# Patient Record
Sex: FEMALE | Race: BLACK OR AFRICAN AMERICAN | NOT HISPANIC OR LATINO | URBAN - METROPOLITAN AREA
[De-identification: names, ages, dates, MRNs, and addresses within clinical notes are randomized per-mention and may not be internally consistent; named-entity substitution may affect disease eponyms.]

---

## 2021-03-29 ENCOUNTER — EMERGENCY (EMERGENCY)
Facility: HOSPITAL | Age: 26
LOS: 1 days | Discharge: HOME | End: 2021-03-29
Attending: EMERGENCY MEDICINE | Admitting: EMERGENCY MEDICINE
Payer: MEDICAID

## 2021-03-29 VITALS
SYSTOLIC BLOOD PRESSURE: 114 MMHG | HEART RATE: 86 BPM | OXYGEN SATURATION: 98 % | RESPIRATION RATE: 16 BRPM | DIASTOLIC BLOOD PRESSURE: 63 MMHG | TEMPERATURE: 98 F

## 2021-03-29 DIAGNOSIS — Z78.9 OTHER SPECIFIED HEALTH STATUS: Chronic | ICD-10-CM

## 2021-03-29 PROCEDURE — 99284 EMERGENCY DEPT VISIT MOD MDM: CPT | Mod: 25

## 2021-03-29 PROCEDURE — 10080 I&D PILONIDAL CYST SIMPLE: CPT

## 2021-03-29 RX ORDER — LIDOCAINE HCL 20 MG/ML
10 VIAL (ML) INJECTION ONCE
Refills: 0 | Status: COMPLETED | OUTPATIENT
Start: 2021-03-29 | End: 2021-03-29

## 2021-03-29 RX ORDER — ACETAMINOPHEN 500 MG
650 TABLET ORAL ONCE
Refills: 0 | Status: COMPLETED | OUTPATIENT
Start: 2021-03-29 | End: 2021-03-29

## 2021-03-29 RX ADMIN — Medication 650 MILLIGRAM(S): at 05:45

## 2021-03-29 RX ADMIN — Medication 10 MILLILITER(S): at 06:25

## 2021-03-29 NOTE — ED PROVIDER NOTE - ATTENDING CONTRIBUTION TO CARE
Attending Statement: I have personally seen and examined this patient. I have fully participated in the care of this patient. I have reviewed all pertinent clinical information, including history physical exam, plan and the Resident's note and agree except as noted  26yo F no sig pmhx pw cyst of the back x one week. Seen at  two days ago for I/D and given abx. Due to  abx  today. Denies fever/chills. Denies n/v PT is 5 months pregnant Attending Statement: I have personally seen and examined this patient. I have fully participated in the care of this patient. I have reviewed all pertinent clinical information, including history physical exam, plan and the Resident's note and agree except as noted  24yo F no sig pmhx pw cyst of the back x one week. Seen at  two days ago for I/D and given abx. Due to  abx  today. Denies fever/chills. Denies n/v PT is 5 months pregnant, no abdominal cramp. no vag bleeding or discharge. +fetal movement.   Vital signs noted. mmm. nontoxic appearing female. ambulatory no distress gravid abdomen nontender. +pilonidal cyst tender to touch, fluctuant. no pedal edema. no calf tenderness. normal pulses bilateral feet.  plan  I/D, fetal HR, re assess

## 2021-03-29 NOTE — ED PROVIDER NOTE - NS ED ROS FT
Gen: Denies fever, weight loss  CV: Denies chest pain, palpitations  Skin: see HPI  Resp: Denies SOB, cough  Endo: Denies sensitivity to heat, cold, increased urination  GI: Denies constipation, nausea, vomiting  Msk: Denies back pain, LE swelling, extremity pain  : see HPI  Neuro: Denies LOC, weakness, seizures  Psych: Denies hx of psych, hallucinations

## 2021-03-29 NOTE — ED PROVIDER NOTE - PATIENT PORTAL LINK FT
You can access the FollowMyHealth Patient Portal offered by [facility] by registering at the following website: [email or web address]. By joining [employer]’s FollowMyHealth portal, you will also be able to view your health information using other applications (apps) compatible with our system.

## 2021-03-29 NOTE — ED ADULT NURSE NOTE - NSIMPLEMENTINTERV_GEN_ALL_ED
Implemented All Universal Safety Interventions:  [place] to call system. Call bell, personal items and telephone within reach. Instruct patient to call for assistance. Room bathroom lighting operational. Non-slip footwear when patient is off stretcher. Physically safe environment: no spills, clutter or unnecessary equipment. Stretcher in lowest position, wheels locked, appropriate side rails in place.

## 2021-03-29 NOTE — ED ADULT NURSE NOTE - OBJECTIVE STATEMENT
received pt in room 12 c/o pain to pilonidal cyst.  had partial drainage on Friday but area still painful and hardened.  pt is23 weeks  months pregnant.. due date  july 21. no abd pain or vag bleeding. cyst not draining.  being eval by md.

## 2021-03-29 NOTE — ED PROVIDER NOTE - PHYSICAL EXAMINATION
PHYSICAL EXAM:  GENERAL: Sitting comfortable in bed, in no acute distress  HENMT: Atraumatic, moist mucous membranes, no oropharyngeal exudates or vesicles, uvula is midline EYES: Clear bilaterally, PERRL, EOMs intact b/l  HEART: RRR, S1/S2, no murmur/gallops/rubs  RESPIRATORY: Clear to auscultation bilaterally, no wheezes/rhonchi/rales  ABDOMEN: +BS, gravid, nontender, nondistended  EXTREMITIES: No lower extremity edema, +2 radial pulses b/l  NEURO:  A&Ox4, no focal motor deficits or sensory deficits   Heme/LYMPH: No ecchymosis or bruising, no anterior/posterior cervical or supraclavicular LAD  SKIN:  Skin normal color for race, warm, dry and intact. (+) small raised abscess like mass to gluteal cleft, consistent with pilonidal cyst.

## 2021-03-29 NOTE — ED PROVIDER NOTE - OBJECTIVE STATEMENT
26 yo F, 5 months pregnant, complaining of pain in her gluteal cleft after having incomplete drainage of pilonidal cyst at  2 days ago. She denies any fevers, chills, abdominal pain, vaginal bleeding, loss of fluids, lack of fetal movement, chest pain, shortness of breath, palpitations, or any other complaints at this time. She has had recurrent pilonidal cysts over the past few years and says that she was supposed to have excision by surgery but then moved to the area before following up. She had I&D 2 days ago and was prescribed antibiotics which she has not picked up yet but says the medication will be ready today.

## 2021-03-29 NOTE — ED PROVIDER NOTE - PROGRESS NOTE DETAILS
Leonardo EATON, PGY1: Pt much improved s/p i&D of pilonidal cyst. FHR detected with ultrasound at bedside at 156 bpm, (+) fetal movement on US. Will d/c patient with plan for surgery f/u for management of recurrent gluteal abscess/pilonidal cyst. Pt will  previously prescribed antibiotics when she leaves ED.

## 2021-03-29 NOTE — ED PROVIDER NOTE - NSFOLLOWUPCLINICS_GEN_ALL_ED_FT
[facility]  General Surgery  [address] [place] [address] - [address] [place] [address]  Phone: [phone]  Fax:   Follow Up Time:

## 2021-03-29 NOTE — ED ADULT TRIAGE NOTE - CHIEF COMPLAINT QUOTE
Patient had cyst drained on her back on monday, having pain to cyst site. Patient also 5 months pregnant. no pregnancy issues at this time

## 2021-03-29 NOTE — ED PROVIDER NOTE - NSFOLLOWUPINSTRUCTIONS_ED_ALL_ED_FT
You were seen in the Emergency Department for a pilonidal cyst. Please follow up with general surgery for further management of recurrent cysts.    1) Advance activity as tolerated.   2) Continue all previously prescribed medications as directed.    3) Follow up with your primary care physician in 24-48 hours - take copies of your results.    4) Return to the Emergency Department for worsening or persistent symptoms, and/or ANY NEW OR CONCERNING SYMPTOMS.

## 2021-03-30 PROBLEM — Z78.9 OTHER SPECIFIED HEALTH STATUS: Chronic | Status: ACTIVE | Noted: 2021-03-29

## 2021-03-31 ENCOUNTER — APPOINTMENT (OUTPATIENT)
Dept: SURGERY | Facility: CLINIC | Age: 26
End: 2021-03-31
Payer: MEDICAID

## 2021-03-31 VITALS
OXYGEN SATURATION: 99 % | HEART RATE: 84 BPM | WEIGHT: 204 LBS | TEMPERATURE: 96.8 F | BODY MASS INDEX: 35.7 KG/M2 | HEIGHT: 63.5 IN | DIASTOLIC BLOOD PRESSURE: 66 MMHG | SYSTOLIC BLOOD PRESSURE: 99 MMHG

## 2021-03-31 DIAGNOSIS — L05.91 PILONIDAL CYST W/OUT ABSCESS: ICD-10-CM

## 2021-03-31 DIAGNOSIS — Z34.90 ENCOUNTER FOR SUPERVISION OF NORMAL PREGNANCY, UNSPECIFIED, UNSPECIFIED TRIMESTER: ICD-10-CM

## 2021-03-31 PROBLEM — Z00.00 ENCOUNTER FOR PREVENTIVE HEALTH EXAMINATION: Status: ACTIVE | Noted: 2021-03-31

## 2021-03-31 PROCEDURE — 99072 ADDL SUPL MATRL&STAF TM PHE: CPT

## 2021-03-31 PROCEDURE — 99202 OFFICE O/P NEW SF 15 MIN: CPT

## 2021-04-05 NOTE — PHYSICAL EXAM
[Respiratory Effort] : normal respiratory effort [Alert] : alert [Oriented to Person] : oriented to person [Oriented to Place] : oriented to place [Oriented to Time] : oriented to time [Calm] : calm [JVD] : no jugular venous distention  [de-identified] : LEWIS BACON NAD [de-identified] : EOMI [de-identified] : + pilonidal cyst with several ostia.

## 2021-04-05 NOTE — HISTORY OF PRESENT ILLNESS
[de-identified] : 24 yo female 5 months pregnant presents for evaluation of a recurrent pilonidal abscess. She states her first I&D 4 years ago and has had about 3-4 more over the past 4 years. She presents today to discuss surgical treatment.

## 2021-04-05 NOTE — ASSESSMENT
[FreeTextEntry1] : 26 yo female with pilonidal disease. Discussed definitive surgery after her pregnancy. If she develops another abscess we discussed f/u in the office for I&D.
